# Patient Record
Sex: FEMALE | Race: WHITE | NOT HISPANIC OR LATINO | Employment: FULL TIME | URBAN - METROPOLITAN AREA
[De-identification: names, ages, dates, MRNs, and addresses within clinical notes are randomized per-mention and may not be internally consistent; named-entity substitution may affect disease eponyms.]

---

## 2019-07-25 PROBLEM — F32.A ANXIETY AND DEPRESSION: Status: ACTIVE | Noted: 2019-07-25

## 2019-07-25 PROBLEM — G43.909 MIGRAINE: Status: ACTIVE | Noted: 2019-07-25

## 2019-07-25 PROBLEM — F41.9 ANXIETY AND DEPRESSION: Status: ACTIVE | Noted: 2019-07-25

## 2019-07-25 PROBLEM — G47.33 OBSTRUCTIVE SLEEP APNEA SYNDROME: Status: ACTIVE | Noted: 2019-07-25

## 2019-07-25 PROBLEM — R73.03 PREDIABETES: Status: ACTIVE | Noted: 2019-07-25

## 2019-07-25 PROBLEM — E55.9 VITAMIN D DEFICIENCY: Status: ACTIVE | Noted: 2019-07-25

## 2019-07-25 PROBLEM — Z72.0 TOBACCO ABUSE: Status: ACTIVE | Noted: 2019-07-25

## 2019-07-29 PROBLEM — E11.9 CONTROLLED TYPE 2 DIABETES MELLITUS WITHOUT COMPLICATION, WITHOUT LONG-TERM CURRENT USE OF INSULIN: Status: ACTIVE | Noted: 2019-07-29

## 2019-07-29 PROBLEM — E78.2 MIXED HYPERLIPIDEMIA: Status: ACTIVE | Noted: 2019-07-29

## 2019-07-29 PROBLEM — R79.89 ELEVATED LFTS: Status: ACTIVE | Noted: 2019-07-29

## 2021-03-30 ENCOUNTER — TELEPHONE (OUTPATIENT)
Dept: BARIATRICS | Facility: CLINIC | Age: 46
End: 2021-03-30

## 2021-04-06 ENCOUNTER — DOCUMENTATION ONLY (OUTPATIENT)
Dept: BARIATRICS | Facility: CLINIC | Age: 46
End: 2021-04-06

## 2021-04-08 ENCOUNTER — TELEPHONE (OUTPATIENT)
Dept: BARIATRICS | Facility: CLINIC | Age: 46
End: 2021-04-08

## 2022-11-15 PROBLEM — E11.65 CONTROLLED TYPE 2 DIABETES MELLITUS WITH HYPERGLYCEMIA, WITHOUT LONG-TERM CURRENT USE OF INSULIN: Status: ACTIVE | Noted: 2019-07-29

## 2023-10-21 PROBLEM — R65.10 SIRS (SYSTEMIC INFLAMMATORY RESPONSE SYNDROME): Status: ACTIVE | Noted: 2023-10-21

## 2023-10-21 PROBLEM — E86.0 DEHYDRATION: Status: ACTIVE | Noted: 2023-10-21

## 2023-10-21 PROBLEM — J98.8 RESPIRATORY INFECTION: Status: ACTIVE | Noted: 2023-10-21

## 2023-10-21 PROBLEM — J96.01 ACUTE RESPIRATORY FAILURE WITH HYPOXIA: Status: ACTIVE | Noted: 2023-10-21

## 2023-10-25 ENCOUNTER — TELEPHONE (OUTPATIENT)
Dept: HEMATOLOGY/ONCOLOGY | Facility: CLINIC | Age: 48
End: 2023-10-25
Payer: COMMERCIAL

## 2023-10-25 NOTE — TELEPHONE ENCOUNTER
----- Message from Laci Salcedo sent at 10/25/2023  9:34 AM CDT -----  Type: Need Medical Advice   Who Called: RAYNA  Best callback number: [phone]  Additional Information: RAYNA called to schedule a two week hfu for the patient to se Dr Kan Garza, please call patient with date and time   Please call to further assist, Thanks.

## 2023-10-25 NOTE — TELEPHONE ENCOUNTER
Returned call to pt.  She is needing hospital f/u with Dr BALDEMAR Garza; transferred pt to their clinic.

## 2024-01-22 PROBLEM — J96.01 ACUTE RESPIRATORY FAILURE WITH HYPOXIA: Status: RESOLVED | Noted: 2023-10-21 | Resolved: 2024-01-22

## 2025-01-09 PROBLEM — R65.10 SIRS (SYSTEMIC INFLAMMATORY RESPONSE SYNDROME): Status: RESOLVED | Noted: 2023-10-21 | Resolved: 2025-01-09

## 2025-01-09 PROBLEM — E55.9 VITAMIN D DEFICIENCY: Status: RESOLVED | Noted: 2019-07-25 | Resolved: 2025-01-09

## 2025-01-09 PROBLEM — E78.2 MIXED HYPERLIPIDEMIA: Status: RESOLVED | Noted: 2019-07-29 | Resolved: 2025-01-09

## 2025-01-09 PROBLEM — F32.A ANXIETY AND DEPRESSION: Status: RESOLVED | Noted: 2019-07-25 | Resolved: 2025-01-09

## 2025-01-09 PROBLEM — J98.8 RESPIRATORY INFECTION: Status: RESOLVED | Noted: 2023-10-21 | Resolved: 2025-01-09

## 2025-01-09 PROBLEM — G43.909 MIGRAINE: Status: RESOLVED | Noted: 2019-07-25 | Resolved: 2025-01-09

## 2025-01-09 PROBLEM — R79.89 ELEVATED LFTS: Status: RESOLVED | Noted: 2019-07-29 | Resolved: 2025-01-09

## 2025-01-09 PROBLEM — M25.521 ELBOW PAIN, RIGHT: Status: ACTIVE | Noted: 2025-01-09

## 2025-01-09 PROBLEM — F41.9 ANXIETY AND DEPRESSION: Status: RESOLVED | Noted: 2019-07-25 | Resolved: 2025-01-09

## 2025-01-09 PROBLEM — R73.03 PREDIABETES: Status: RESOLVED | Noted: 2019-07-25 | Resolved: 2025-01-09

## 2025-01-09 PROBLEM — M25.421 ELBOW EFFUSION, RIGHT: Status: ACTIVE | Noted: 2025-01-09

## 2025-01-09 PROBLEM — E86.0 DEHYDRATION: Status: RESOLVED | Noted: 2023-10-21 | Resolved: 2025-01-09

## 2025-01-10 PROBLEM — M00.9 PYOGENIC ARTHRITIS OF RIGHT ELBOW: Status: ACTIVE | Noted: 2025-01-10

## 2025-01-11 PROBLEM — M25.421 ELBOW EFFUSION, RIGHT: Status: RESOLVED | Noted: 2025-01-09 | Resolved: 2025-01-11

## 2025-01-11 PROBLEM — M25.521 ELBOW PAIN, RIGHT: Status: RESOLVED | Noted: 2025-01-09 | Resolved: 2025-01-11

## 2025-01-15 ENCOUNTER — TELEPHONE (OUTPATIENT)
Dept: ORTHOPEDICS | Facility: CLINIC | Age: 50
End: 2025-01-15
Payer: COMMERCIAL

## 2025-01-23 ENCOUNTER — OFFICE VISIT (OUTPATIENT)
Dept: ORTHOPEDICS | Facility: CLINIC | Age: 50
End: 2025-01-23
Payer: COMMERCIAL

## 2025-01-23 DIAGNOSIS — M00.9 PYOGENIC ARTHRITIS OF RIGHT ELBOW, DUE TO UNSPECIFIED ORGANISM: Primary | ICD-10-CM

## 2025-01-23 PROCEDURE — 99024 POSTOP FOLLOW-UP VISIT: CPT | Mod: S$GLB,,, | Performed by: ORTHOPAEDIC SURGERY

## 2025-01-23 PROCEDURE — 99999 PR PBB SHADOW E&M-EST. PATIENT-LVL III: CPT | Mod: PBBFAC,,, | Performed by: ORTHOPAEDIC SURGERY

## 2025-01-23 PROCEDURE — 1159F MED LIST DOCD IN RCRD: CPT | Mod: CPTII,S$GLB,, | Performed by: ORTHOPAEDIC SURGERY

## 2025-01-23 PROCEDURE — 1160F RVW MEDS BY RX/DR IN RCRD: CPT | Mod: CPTII,S$GLB,, | Performed by: ORTHOPAEDIC SURGERY

## 2025-01-23 NOTE — PROGRESS NOTES
Status/Diagnosis:  Septic arthritis of the right elbow.  Date of Surgery:   01/10/2025: Right elbow arthrotomy with I&D  Date of Injury: none  Return visit: as needed  X-rays on Return: none      POSTOP  The patient returns today for routine post op visit.  Her pain today is a 0/10.  Overall she feels much better.  She denies any fever or chills.  She still has her PICC line and has been compliant with IV antibiotics.        Past Medical History:   Diagnosis Date    Adrenal tumor     saw DR Cain who recommneded she gets off  mirapex  and get additional tests doen but she refused. in 2014    Anxiety and depression 07/25/2019    Controlled type 2 diabetes mellitus without complication, without long-term current use of insulin 07/29/2019    Depression     Fibromyalgia     Glucose intolerance (impaired glucose tolerance)     Hyperlipidemia     Hypertension     Insulin resistance syndrome     Migraine     Myalgia     Obesity     RLS (restless legs syndrome)     Sleep apnea in adult     has not used CPAP in years    Tachycardia        Past Surgical History:   Procedure Laterality Date    achilles tendon surgery for bony spur      BTL      CARPAL TUNNEL RELEASE      ENDOMETRIAL ABLATION      INCISION AND DRAINAGE Right 1/10/2025    Procedure: Incision and Drainage, ELBOW;  Surgeon: Teddy Villanueva MD;  Location: [facility];  Service: Orthopedics;  Laterality: Right;    TUBAL LIGATION         Current Outpatient Medications   Medication Sig    0.9% NaCl PgBk 100 mL with ertapenem 1 gram SolR 1 g Inject 1 g into the vein once daily. for 18 days    blood sugar diagnostic Strp To check BG 2-3 times daily, to use with insurance preferred meter.    blood-glucose sensor (FREESTYLE JEAN PAUL 3 SENSOR) Apple 1 each by Misc.(Non-Drug; Combo Route) route every 14 (fourteen) days.    DAPTOmycin 50 mg/mL in 0.9% NaCl solution Inject 13 mLs (650 mg total) into the vein once daily. for 18 days    hydrocortisone 2.5 % cream Apply topically  "daily as needed. (Patient taking differently: Apply 1 application  topically every other day. for eczema on face)    insulin glargine-yfgn (SEMGLEE,INSULIN GLARG-YFGN,PEN) 100 unit/mL (3 mL) InPn Inject 20 Units into the skin once daily. (Patient taking differently: Inject 20 Units into the skin every evening.)    lancets Misc To check BG 2-3 times daily, to use with insurance preferred meter    melatonin (MELATIN) 5 mg Take 5 mg by mouth nightly as needed (for sleep).    pen needle, diabetic 32 gauge x 5/32" Ndle To subcutaneous insulin injections daily as prescribed.    pramipexole (MIRAPEX) 1 MG tablet Take 1 tablet (1 mg total) by mouth 2 (two) times daily.    propranoloL (INDERAL LA) 160 mg 24 hr capsule Take 1 capsule (160 mg total) by mouth once daily. (Patient taking differently: Take 160 mg by mouth every evening.)    semaglutide (OZEMPIC) 2 mg/dose (8 mg/3 mL) PnIj Inject 2 mg into the skin every 7 days.    sertraline (ZOLOFT) 100 MG tablet Take 1 tablet (100 mg total) by mouth once daily.    valsartan-hydrochlorothiazide (DIOVAN-HCT) 320-25 mg per tablet Take 1 tablet by mouth once daily. (Patient taking differently: Take 1 tablet by mouth every evening.)    fluticasone propionate (FLONASE) 50 mcg/actuation nasal spray 2 sprays (100 mcg total) by Each Nostril route once daily.    naproxen sodium (ANAPROX) 220 MG tablet Take 440 mg by mouth daily as needed (for pain).     No current facility-administered medications for this visit.       Social History     Socioeconomic History    Marital status:    Tobacco Use    Smoking status: Every Day     Current packs/day: 0.50     Average packs/day: 0.5 packs/day for 15.0 years (7.5 ttl pk-yrs)     Types: Cigarettes    Smokeless tobacco: Never    Tobacco comments:     half pack daily.   Substance and Sexual Activity    Alcohol use: Yes     Comment: rarely    Drug use: No    Sexual activity: Yes     Partners: Male     Social Drivers of Health     Financial " Resource Strain: Low Risk  (1/22/2025)    Overall Financial Resource Strain (CARDIA)     Difficulty of Paying Living Expenses: Not very hard   Food Insecurity: No Food Insecurity (1/22/2025)    Hunger Vital Sign     Worried About Running Out of Food in the Last Year: Never true     Ran Out of Food in the Last Year: Never true   Transportation Needs: No Transportation Needs (1/11/2025)    TRANSPORTATION NEEDS     Transportation : No   Physical Activity: Insufficiently Active (1/22/2025)    Exercise Vital Sign     Days of Exercise per Week: 2 days     Minutes of Exercise per Session: 30 min   Stress: Stress Concern Present (1/22/2025)    [employer] [place] [employer] - Occupational Stress Questionnaire     Feeling of Stress : To some extent   Housing Stability: Low Risk  (1/22/2025)    Housing Stability Vital Sign     Unable to Pay for Housing in the Last Year: No     Homeless in the Last Year: No       Physical exam:  There were no vitals filed for this visit.  There is no height or weight on file to calculate BMI.  General: In no apparent distress; well developed and well nourished.  HEENT: normocephalic; atraumatic.  Cardiovascular: regular rate.  Respiratory: no increased work of breathing.  Musculoskeletal:   Inspection:   Surgical site is well healed.  Patient has full painless elbow range of motion today.  No warmth or erythema.  No fluctuance.  No residual signs or symptoms of infection.  Gross motor and sensory function intact.  PIN function intact.  Palpable radial pulse.                     Imaging Studies/Outside documentation:  I have ordered/reviewed/interpreted the following images/outside documentation:  No new imaging today.        Assessment:  Terri Joya is a 49 y.o. female with Septic arthritis of the right elbow.     Plan:   Sutures were removed today and steri-strips applied  Recommend continue IV antibiotics per ID recommendations.  Per ID recommendations she is to complete 3 week  regimen of antibiotics and she does not need outpatient follow up.  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  All questions were answered and patient demonstrated understanding  She may follow up in clinic as needed.  Patient voiced understanding.  All questions were answered.        This note was created using voice recognition software and may contain grammatical errors.